# Patient Record
Sex: FEMALE | Race: WHITE | NOT HISPANIC OR LATINO
[De-identification: names, ages, dates, MRNs, and addresses within clinical notes are randomized per-mention and may not be internally consistent; named-entity substitution may affect disease eponyms.]

---

## 2017-03-24 ENCOUNTER — HOSPITAL (OUTPATIENT)
Dept: OTHER | Age: 27
End: 2017-03-24
Attending: EMERGENCY MEDICINE

## 2017-03-25 LAB
HCG POINT OF CARE (5HGRST): NEGATIVE
HG POINT OF CARE QC: NORMAL

## 2017-06-09 ENCOUNTER — CHARTING TRANS (OUTPATIENT)
Dept: OTHER | Age: 27
End: 2017-06-09

## 2017-10-24 ENCOUNTER — CHARTING TRANS (OUTPATIENT)
Dept: OTHER | Age: 27
End: 2017-10-24

## 2018-11-02 VITALS
TEMPERATURE: 98.5 F | WEIGHT: 129.8 LBS | HEART RATE: 78 BPM | HEIGHT: 70 IN | SYSTOLIC BLOOD PRESSURE: 119 MMHG | DIASTOLIC BLOOD PRESSURE: 80 MMHG | BODY MASS INDEX: 18.58 KG/M2

## 2018-11-03 VITALS
SYSTOLIC BLOOD PRESSURE: 114 MMHG | HEIGHT: 70 IN | HEART RATE: 90 BPM | WEIGHT: 136.8 LBS | DIASTOLIC BLOOD PRESSURE: 70 MMHG | BODY MASS INDEX: 19.58 KG/M2

## 2018-11-21 ENCOUNTER — HOSPITAL ENCOUNTER (OUTPATIENT)
Age: 28
Discharge: HOME | End: 2018-11-21
Attending: FAMILY MEDICINE
Payer: COMMERCIAL

## 2018-11-21 VITALS
SYSTOLIC BLOOD PRESSURE: 128 MMHG | OXYGEN SATURATION: 98 % | BODY MASS INDEX: 19.26 KG/M2 | WEIGHT: 130 LBS | DIASTOLIC BLOOD PRESSURE: 88 MMHG | HEART RATE: 114 BPM | HEIGHT: 69 IN | RESPIRATION RATE: 16 BRPM | TEMPERATURE: 98 F

## 2018-11-21 DIAGNOSIS — R35.0 URINARY FREQUENCY: ICD-10-CM

## 2018-11-21 DIAGNOSIS — J01.90 ACUTE SINUSITIS, RECURRENCE NOT SPECIFIED, UNSPECIFIED LOCATION: Primary | ICD-10-CM

## 2018-11-21 PROCEDURE — 81002 URINALYSIS NONAUTO W/O SCOPE: CPT | Performed by: FAMILY MEDICINE

## 2018-11-21 PROCEDURE — 99203 OFFICE O/P NEW LOW 30 MIN: CPT

## 2018-11-21 PROCEDURE — 99204 OFFICE O/P NEW MOD 45 MIN: CPT

## 2018-11-21 PROCEDURE — 81025 URINE PREGNANCY TEST: CPT | Performed by: FAMILY MEDICINE

## 2018-11-21 RX ORDER — AMOXICILLIN AND CLAVULANATE POTASSIUM 875; 125 MG/1; MG/1
1 TABLET, FILM COATED ORAL 2 TIMES DAILY
Qty: 14 TABLET | Refills: 0 | Status: SHIPPED | OUTPATIENT
Start: 2018-11-21 | End: 2018-11-28

## 2018-11-21 NOTE — ED PROVIDER NOTES
Patient Seen in: [facility]    History   Patient presents with:  Urinary Symptoms (urologic)    Stated Complaint: urinary complaint    HPI    82-year-old female presents with chief complaints of blood in the urine when she af 19.20 kg/m²         Physical Exam      Patient is alert oriented no distress  Postnasal drip, some erythema appreciated in the oropharynx  Nose: Bilaterally nasal turbinate hypertrophy with redness, clear to mucoid discharge appreciated  Neck supple, full

## 2018-11-21 NOTE — ED INITIAL ASSESSMENT (HPI)
The patient is here with complaints of dysuria, hematuria, urgency, and frequency x 2-3 days. She states last night is when she noticed the hematuria and has also noticed a tightness to the bilateral mid to lower back.   She hasn't taken anything for these